# Patient Record
Sex: FEMALE | Race: OTHER | Employment: FULL TIME | URBAN - METROPOLITAN AREA
[De-identification: names, ages, dates, MRNs, and addresses within clinical notes are randomized per-mention and may not be internally consistent; named-entity substitution may affect disease eponyms.]

---

## 2024-03-15 ENCOUNTER — APPOINTMENT (OUTPATIENT)
Dept: GENERAL RADIOLOGY | Age: 24
End: 2024-03-15
Payer: COMMERCIAL

## 2024-03-15 ENCOUNTER — HOSPITAL ENCOUNTER (EMERGENCY)
Age: 24
Discharge: HOME | End: 2024-03-15
Payer: COMMERCIAL

## 2024-03-15 VITALS
SYSTOLIC BLOOD PRESSURE: 128 MMHG | RESPIRATION RATE: 16 BRPM | WEIGHT: 120 LBS | OXYGEN SATURATION: 99 % | DIASTOLIC BLOOD PRESSURE: 63 MMHG | TEMPERATURE: 98.2 F | HEART RATE: 96 BPM | BODY MASS INDEX: 22.08 KG/M2 | HEIGHT: 62 IN

## 2024-03-15 DIAGNOSIS — Z23 NEED FOR PROPHYLACTIC VACCINATION AND INOCULATION AGAINST RABIES: ICD-10-CM
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
## 2024-03-15 PROCEDURE — 99284 EMERGENCY DEPT VISIT MOD MDM: CPT

## 2024-03-15 PROCEDURE — 90714 TD VACC NO PRESV 7 YRS+ IM: CPT

## 2024-03-15 PROCEDURE — 90472 IMMUNIZATION ADMIN EACH ADD: CPT

## 2024-03-15 PROCEDURE — 90375 RABIES IG IM/SC: CPT

## 2024-03-15 PROCEDURE — 90675 RABIES VACCINE IM: CPT

## 2024-03-15 PROCEDURE — 6370000000 HC RX 637 (ALT 250 FOR IP)

## 2024-03-15 PROCEDURE — 73130 X-RAY EXAM OF HAND: CPT

## 2024-03-15 PROCEDURE — 90471 IMMUNIZATION ADMIN: CPT

## 2024-03-15 PROCEDURE — 6360000002 HC RX W HCPCS

## 2024-03-15 RX ORDER — BACITRACIN ZINC 500 [USP'U]/G
OINTMENT TOPICAL ONCE
Status: DISCONTINUED | OUTPATIENT
Start: 2024-03-15 | End: 2024-03-16 | Stop reason: HOSPADM

## 2024-03-15 RX ORDER — AMOXICILLIN AND CLAVULANATE POTASSIUM 875; 125 MG/1; MG/1
1 TABLET, FILM COATED ORAL 2 TIMES DAILY
Qty: 20 TABLET | Refills: 0 | Status: SHIPPED | OUTPATIENT
Start: 2024-03-15 | End: 2024-03-25

## 2024-03-15 RX ORDER — BACITRACIN ZINC AND POLYMYXIN B SULFATE 500; 1000 [USP'U]/G; [USP'U]/G
OINTMENT TOPICAL
Qty: 15 G | Refills: 0 | Status: SHIPPED | OUTPATIENT
Start: 2024-03-15 | End: 2024-03-22

## 2024-03-15 RX ORDER — AMOXICILLIN AND CLAVULANATE POTASSIUM 875; 125 MG/1; MG/1
1 TABLET, FILM COATED ORAL ONCE
Status: COMPLETED | OUTPATIENT
Start: 2024-03-15 | End: 2024-03-15

## 2024-03-15 RX ADMIN — RABIES IMMUNE GLOBULIN (HUMAN) 1080 UNITS: 300 INJECTION, SOLUTION INFILTRATION; INTRAMUSCULAR at 23:00

## 2024-03-15 RX ADMIN — Medication 1 ML: at 22:57

## 2024-03-15 RX ADMIN — AMOXICILLIN AND CLAVULANATE POTASSIUM 1 TABLET: 875; 125 TABLET, FILM COATED ORAL at 23:22

## 2024-03-15 RX ADMIN — CLOSTRIDIUM TETANI TOXOID ANTIGEN (FORMALDEHYDE INACTIVATED) AND CORYNEBACTERIUM DIPHTHERIAE TOXOID ANTIGEN (FORMALDEHYDE INACTIVATED) 0.5 ML: 5; 2 INJECTION, SUSPENSION INTRAMUSCULAR at 22:55

## 2024-03-15 ASSESSMENT — PAIN - FUNCTIONAL ASSESSMENT: PAIN_FUNCTIONAL_ASSESSMENT: 0-10

## 2024-03-15 ASSESSMENT — LIFESTYLE VARIABLES
HOW MANY STANDARD DRINKS CONTAINING ALCOHOL DO YOU HAVE ON A TYPICAL DAY: PATIENT DOES NOT DRINK
HOW OFTEN DO YOU HAVE A DRINK CONTAINING ALCOHOL: NEVER

## 2024-03-15 ASSESSMENT — PAIN SCALES - GENERAL: PAINLEVEL_OUTOF10: 7

## 2024-03-15 NOTE — ED NOTES
Department of Emergency Medicine  FIRST PROVIDER TRIAGE NOTE             Independent MLP           3/15/24  6:28 PM EDT    Date of Encounter: 3/15/24   MRN: [id number]      HPI: Cherie Lazo is a 23 y.o. female who presents to the ED for Animal Bite (Right hand 4th digit, unknown dog. Workers comp)  Bit by dog at work, right 4th digit, unknown tetanus, unknown if dog was UTD on vaccines    ROS: Negative for cp, sob, or fever.    PE: Gen Appearance/Constitutional: alert  Musculoskeletal: moves all extremities x 4     Initial Plan of Care: All treatment areas with department are currently occupied. Plan to order/Initiate the following while awaiting opening in ED: imaging studies.  Initiate Treatment-Testing, Proceed toTreatment Area When Bed Available for ED Attending/MLP to Continue Care    Electronically signed by NOÉ Thompson NP   DD: 3/15/24       Praveen Nguyen APRN - NP  03/15/24 7603

## 2024-03-16 NOTE — ED PROVIDER NOTES
UNIT/ML injection 1,080 Units (1,080 Units Infiltration Given 3/15/24 2300)   amoxicillin-clavulanate (AUGMENTIN) 875-125 MG per tablet 1 tablet (1 tablet Oral Given 3/15/24 2322)     CONSULTS: (Who and What was discussed)  None  Discussion with Other Profesionals : None    Social Determinants : None    Records Reviewed : None    CC/HPI Summary, DDx, ED Course, and Reassessment: Cherie Lazo is a 23 y.o. female who presents to the emergency department with complaints of dog bite to 4th difit on right hand. Patient states she was petting a dog while working at [employer] and the dog bite her, did not thrash. She had immediate pain and some numbness to tip of affected finger. She is unsure when her last tetanus shot was. She is unsure if dog was UTD on vaccines as it was a stranger's dog and she did not ask. She states her boss is trying to contact dog owner but has been unsuccessful thus far. On assessment, she is in no acute distress, nontoxic appearing, respirations are easy and unlabored. GCS is 15. Bleeding is controlled from dog bite.Patient denies any skin changes, fever, chest pain, shortness of breath, abdominal pain, nausea, vomiting, light headedness, dizziness.    Differential diagnoses include but are not limited to: dog bite, abrasion, lacerations, fracture, dislocation, rabies prophylaxis     Patient is a 23 y.o. female that presents to the ED after being bit by dog to right fourth digit on hand. Her bleeding is controlled on assessment, XR of hand shows no acute fracture or dislocation   XR HAND RIGHT (MIN 3 VIEWS)   Final Result   No acute osseous abnormality.      No radiopaque foreign bodies.         She was given a tetanus shot. She was also given rabies vaccine as well as rabies immune globulin. Immune globulin was injected directly into and around all puncture sites/wounds and the rest was administered IM. Prior to rabies vaccine and immune globulin, I performed copious amount of irrigation    bacitracin-polymyxin b (POLYSPORIN) [id number] UNIT/GM ointment Apply topically 2 times daily., Disp-15 g, R-0, Normal             DISCONTINUED MEDICATIONS:  Discharge Medication List as of 3/15/2024 11:23 PM        (Please note that portions of this note were completed with a voice recognition program.  Efforts were made to edit the dictations but occasionally words are mis-transcribed.)    NOÉ Thompson - NP (electronically signed)      Praveen Nguyen APRN - NP  03/16/24 [id number]

## 2024-03-16 NOTE — DISCHARGE INSTRUCTIONS
Please keep affected area clean and covered, especially at work.  If you notice any redness, swelling, increase in pain, drainage, red streaking down the hand into forearm, fevers, please return to the emergency department.  You can apply antibiotic twice daily to affected area.  Please take oral antibiotic as prescribed.  Do not miss any doses.  Finish entire course of antibiotic.  Please return for repeat doses of rabies vaccine.

## 2024-03-18 ENCOUNTER — HOSPITAL ENCOUNTER (OUTPATIENT)
Dept: INFUSION THERAPY | Age: 24
Setting detail: INFUSION SERIES
Discharge: HOME | End: 2024-03-18
Payer: COMMERCIAL

## 2024-03-18 VITALS
WEIGHT: 120 LBS | BODY MASS INDEX: 22.08 KG/M2 | DIASTOLIC BLOOD PRESSURE: 73 MMHG | OXYGEN SATURATION: 99 % | TEMPERATURE: 98.5 F | SYSTOLIC BLOOD PRESSURE: 122 MMHG | RESPIRATION RATE: 16 BRPM | HEIGHT: 62 IN | HEART RATE: 82 BPM

## 2024-03-18 DIAGNOSIS — Z23 NEED FOR PROPHYLACTIC VACCINATION AGAINST RABIES: Primary | ICD-10-CM

## 2024-03-18 PROCEDURE — 90471 IMMUNIZATION ADMIN: CPT

## 2024-03-18 PROCEDURE — 6360000002 HC RX W HCPCS

## 2024-03-18 PROCEDURE — 90675 RABIES VACCINE IM: CPT

## 2024-03-18 RX ADMIN — RABIES VACCINE 1 ML: KIT at 15:20

## 2024-03-18 NOTE — PROGRESS NOTES
Tolerated injection well.  Reviewed therapy plan, offered education material and/or discharge material, reviewed medication information and signs and symptoms  and educated on possible side effects, verbalizes good knowledge of current plan patient verbalizes understanding, and has no signs or symptoms to report at this time.   Patient discharged. Patient alert and oriented x3.   No distress noted.   Vital signs stable.   Patient denies any new or worsening pain.  Patient denies any needs.  All questions answered.  Next appointment scheduled. declined copy of AVS. Patient did not stay after injection.

## 2024-03-22 ENCOUNTER — HOSPITAL ENCOUNTER (OUTPATIENT)
Dept: INFUSION THERAPY | Age: 24
Setting detail: INFUSION SERIES
Discharge: HOME | End: 2024-03-22
Payer: COMMERCIAL

## 2024-03-22 VITALS
OXYGEN SATURATION: 100 % | HEART RATE: 88 BPM | DIASTOLIC BLOOD PRESSURE: 73 MMHG | SYSTOLIC BLOOD PRESSURE: 120 MMHG | RESPIRATION RATE: 16 BRPM | TEMPERATURE: 98 F

## 2024-03-22 DIAGNOSIS — Z23 NEED FOR PROPHYLACTIC VACCINATION AGAINST RABIES: Primary | ICD-10-CM

## 2024-03-22 PROCEDURE — 90471 IMMUNIZATION ADMIN: CPT

## 2024-03-22 PROCEDURE — 6360000002 HC RX W HCPCS

## 2024-03-22 PROCEDURE — 90675 RABIES VACCINE IM: CPT

## 2024-03-22 RX ADMIN — RABIES VACCINE 1 ML: KIT at 09:33

## 2024-03-22 NOTE — PROGRESS NOTES
Tolerated injection well.  Reviewed therapy plan, offered education material and/or discharge material, reviewed medication information and signs and symptoms  and educated on possible side effects, verbalizes good knowledge of current plan patient verbalizes understanding, and has no signs or symptoms to report at this time.   Patient discharged. Patient alert and oriented x3.   No distress noted.   Vital signs stable.   Patient denies any new or worsening pain.  Patient denies any needs.  All questions answered.  Next appointment scheduled. DECLINES  copy of AVS. Patient dID NOT STAY AFTER INJECTION SINCE SHE HAS HAD BEFORE

## 2024-03-28 ENCOUNTER — TELEPHONE (OUTPATIENT)
Dept: INFUSION THERAPY | Age: 24
End: 2024-03-28

## 2024-03-28 NOTE — TELEPHONE ENCOUNTER
Pt called and said that she received the information for the dog. The dog is up to date on its rabies so pt wants to cancel the appt.  Cancelling and closing referral.

## 2024-03-29 ENCOUNTER — HOSPITAL ENCOUNTER (OUTPATIENT)
Dept: INFUSION THERAPY | Age: 24
Setting detail: INFUSION SERIES
End: 2024-03-29